# Patient Record
(demographics unavailable — no encounter records)

---

## 2018-12-26 NOTE — REP
Clinical:  Trauma. Pain.

 

Technique:  AP, lateral, bilateral oblique views right wrist .

 

Findings:  The carpal bones, surrounding osseous structures, soft tissues, and

joint spaces are normal.  There is no evidence for acute fracture or dislocation.

No subcutaneous emphysema or radiodense foreign body.

 

Impression:

No obvious acute fracture.  If the patient remains symptomatic consider

reevaluation in 3-5 days including scaphoid view if necessary.

 

 

Electronically Signed by

Ruben Blackwell MD 12/26/2018 09:15 A

## 2018-12-26 NOTE — REP
Clinical:  Trauma.  Pain.

 

Technique:  AP, lateral, bilateral oblique views of the right foot.

 

Findings:

There is an oblique nondisplaced fracture of the third toe proximal phalanx.

Remainder examination is normal for age.

 

Impression:

Oblique nondisplaced fracture of the third toe proximal phalanx.

 

 

Electronically Signed by

Ruben Blackwell MD 12/26/2018 09:13 A